# Patient Record
Sex: MALE | Race: WHITE
[De-identification: names, ages, dates, MRNs, and addresses within clinical notes are randomized per-mention and may not be internally consistent; named-entity substitution may affect disease eponyms.]

---

## 2017-03-28 ENCOUNTER — HOSPITAL ENCOUNTER (OUTPATIENT)
Dept: HOSPITAL 47 - CPPFTMAIN | Age: 57
Discharge: HOME | End: 2017-03-28
Payer: COMMERCIAL

## 2017-03-28 DIAGNOSIS — J45.909: Primary | ICD-10-CM

## 2017-03-28 DIAGNOSIS — J98.8: ICD-10-CM

## 2017-03-28 PROCEDURE — 94060 EVALUATION OF WHEEZING: CPT

## 2017-03-28 PROCEDURE — 94726 PLETHYSMOGRAPHY LUNG VOLUMES: CPT

## 2017-03-28 PROCEDURE — 94729 DIFFUSING CAPACITY: CPT

## 2023-03-21 ENCOUNTER — HOSPITAL ENCOUNTER (INPATIENT)
Dept: HOSPITAL 47 - EC | Age: 63
LOS: 3 days | Discharge: HOME | DRG: 69 | End: 2023-03-24
Attending: INTERNAL MEDICINE | Admitting: INTERNAL MEDICINE
Payer: COMMERCIAL

## 2023-03-21 DIAGNOSIS — R20.0: ICD-10-CM

## 2023-03-21 DIAGNOSIS — I34.0: ICD-10-CM

## 2023-03-21 DIAGNOSIS — Z87.19: ICD-10-CM

## 2023-03-21 DIAGNOSIS — Z79.82: ICD-10-CM

## 2023-03-21 DIAGNOSIS — G31.89: ICD-10-CM

## 2023-03-21 DIAGNOSIS — I16.0: ICD-10-CM

## 2023-03-21 DIAGNOSIS — Z79.01: ICD-10-CM

## 2023-03-21 DIAGNOSIS — E66.3: ICD-10-CM

## 2023-03-21 DIAGNOSIS — J34.2: ICD-10-CM

## 2023-03-21 DIAGNOSIS — Z79.899: ICD-10-CM

## 2023-03-21 DIAGNOSIS — R00.1: ICD-10-CM

## 2023-03-21 DIAGNOSIS — E87.1: ICD-10-CM

## 2023-03-21 DIAGNOSIS — M25.512: ICD-10-CM

## 2023-03-21 DIAGNOSIS — G45.9: Primary | ICD-10-CM

## 2023-03-21 DIAGNOSIS — J44.9: ICD-10-CM

## 2023-03-21 DIAGNOSIS — I10: ICD-10-CM

## 2023-03-21 DIAGNOSIS — R47.01: ICD-10-CM

## 2023-03-21 DIAGNOSIS — E78.5: ICD-10-CM

## 2023-03-21 DIAGNOSIS — G43.909: ICD-10-CM

## 2023-03-21 DIAGNOSIS — J32.0: ICD-10-CM

## 2023-03-21 DIAGNOSIS — I48.0: ICD-10-CM

## 2023-03-21 LAB
ALBUMIN SERPL-MCNC: 4.1 G/DL (ref 3.5–5)
ALP SERPL-CCNC: 69 U/L (ref 38–126)
ALT SERPL-CCNC: 35 U/L (ref 4–49)
ANION GAP SERPL CALC-SCNC: 6 MMOL/L
APTT BLD: 22 SEC (ref 22–30)
AST SERPL-CCNC: 26 U/L (ref 17–59)
BASOPHILS # BLD AUTO: 0.1 K/UL (ref 0–0.2)
BASOPHILS NFR BLD AUTO: 1 %
BUN SERPL-SCNC: 14 MG/DL (ref 9–20)
CALCIUM SPEC-MCNC: 8.9 MG/DL (ref 8.4–10.2)
CHLORIDE SERPL-SCNC: 107 MMOL/L (ref 98–107)
CO2 SERPL-SCNC: 25 MMOL/L (ref 22–30)
EOSINOPHIL # BLD AUTO: 0.3 K/UL (ref 0–0.7)
EOSINOPHIL NFR BLD AUTO: 4 %
ERYTHROCYTE [DISTWIDTH] IN BLOOD BY AUTOMATED COUNT: 4.99 M/UL (ref 4.3–5.9)
ERYTHROCYTE [DISTWIDTH] IN BLOOD: 13.9 % (ref 11.5–15.5)
GLUCOSE SERPL-MCNC: 116 MG/DL (ref 74–99)
HCT VFR BLD AUTO: 43.1 % (ref 39–53)
HGB BLD-MCNC: 14.8 GM/DL (ref 13–17.5)
INR PPP: 0.9 (ref ?–1.2)
LYMPHOCYTES # SPEC AUTO: 1.4 K/UL (ref 1–4.8)
LYMPHOCYTES NFR SPEC AUTO: 20 %
MCH RBC QN AUTO: 29.7 PG (ref 25–35)
MCHC RBC AUTO-ENTMCNC: 34.4 G/DL (ref 31–37)
MCV RBC AUTO: 86.3 FL (ref 80–100)
MONOCYTES # BLD AUTO: 0.5 K/UL (ref 0–1)
MONOCYTES NFR BLD AUTO: 7 %
NEUTROPHILS # BLD AUTO: 4.4 K/UL (ref 1.3–7.7)
NEUTROPHILS NFR BLD AUTO: 63 %
PLATELET # BLD AUTO: 211 K/UL (ref 150–450)
POTASSIUM SERPL-SCNC: 4.5 MMOL/L (ref 3.5–5.1)
PROT SERPL-MCNC: 6.6 G/DL (ref 6.3–8.2)
PT BLD: 9.5 SEC (ref 9–12)
SODIUM SERPL-SCNC: 138 MMOL/L (ref 137–145)
WBC # BLD AUTO: 6.9 K/UL (ref 3.8–10.6)

## 2023-03-21 PROCEDURE — 85610 PROTHROMBIN TIME: CPT

## 2023-03-21 PROCEDURE — 85730 THROMBOPLASTIN TIME PARTIAL: CPT

## 2023-03-21 PROCEDURE — 70551 MRI BRAIN STEM W/O DYE: CPT

## 2023-03-21 PROCEDURE — 93005 ELECTROCARDIOGRAM TRACING: CPT

## 2023-03-21 PROCEDURE — 93880 EXTRACRANIAL BILAT STUDY: CPT

## 2023-03-21 PROCEDURE — 96372 THER/PROPH/DIAG INJ SC/IM: CPT

## 2023-03-21 PROCEDURE — 94760 N-INVAS EAR/PLS OXIMETRY 1: CPT

## 2023-03-21 PROCEDURE — 70496 CT ANGIOGRAPHY HEAD: CPT

## 2023-03-21 PROCEDURE — 70498 CT ANGIOGRAPHY NECK: CPT

## 2023-03-21 PROCEDURE — 85025 COMPLETE CBC W/AUTO DIFF WBC: CPT

## 2023-03-21 PROCEDURE — 93306 TTE W/DOPPLER COMPLETE: CPT

## 2023-03-21 PROCEDURE — 80053 COMPREHEN METABOLIC PANEL: CPT

## 2023-03-21 PROCEDURE — 36415 COLL VENOUS BLD VENIPUNCTURE: CPT

## 2023-03-21 PROCEDURE — 93270 REMOTE 30 DAY ECG REV/REPORT: CPT

## 2023-03-21 PROCEDURE — 70450 CT HEAD/BRAIN W/O DYE: CPT

## 2023-03-21 PROCEDURE — 84484 ASSAY OF TROPONIN QUANT: CPT

## 2023-03-21 PROCEDURE — 99285 EMERGENCY DEPT VISIT HI MDM: CPT

## 2023-03-21 PROCEDURE — 96360 HYDRATION IV INFUSION INIT: CPT

## 2023-03-21 PROCEDURE — 82550 ASSAY OF CK (CPK): CPT

## 2023-03-21 PROCEDURE — 96361 HYDRATE IV INFUSION ADD-ON: CPT

## 2023-03-21 PROCEDURE — 80061 LIPID PANEL: CPT

## 2023-03-21 PROCEDURE — B246ZZ4 ULTRASONOGRAPHY OF RIGHT AND LEFT HEART, TRANSESOPHAGEAL: ICD-10-PCS

## 2023-03-21 PROCEDURE — 83036 HEMOGLOBIN GLYCOSYLATED A1C: CPT

## 2023-03-21 PROCEDURE — 71046 X-RAY EXAM CHEST 2 VIEWS: CPT

## 2023-03-21 RX ADMIN — ENOXAPARIN SODIUM SCH MG: 40 INJECTION SUBCUTANEOUS at 15:32

## 2023-03-21 RX ADMIN — CEFAZOLIN SCH: 330 INJECTION, POWDER, FOR SOLUTION INTRAMUSCULAR; INTRAVENOUS at 20:26

## 2023-03-21 RX ADMIN — AMOXICILLIN AND CLAVULANATE POTASSIUM SCH EACH: 875; 125 TABLET, FILM COATED ORAL at 20:25

## 2023-03-21 RX ADMIN — CEFAZOLIN SCH MLS/HR: 330 INJECTION, POWDER, FOR SOLUTION INTRAMUSCULAR; INTRAVENOUS at 11:32

## 2023-03-21 RX ADMIN — AMOXICILLIN AND CLAVULANATE POTASSIUM SCH EACH: 875; 125 TABLET, FILM COATED ORAL at 12:16

## 2023-03-21 RX ADMIN — ATORVASTATIN CALCIUM SCH MG: 40 TABLET, FILM COATED ORAL at 20:00

## 2023-03-21 NOTE — US
EXAMINATION TYPE: US carotid duplex BILAT

 

DATE OF EXAM: 3/21/2023

 

COMPARISON: NONE

 

CLINICAL HISTORY: Stenosis. Pt states left facial numbness 

 

TECHNIQUE: Carotid duplex ultrasound examination. Indirect Doppler criteria was utilized.

 

FINDINGS:

 

EXAM MEASUREMENTS: 

 

RIGHT:  Peak Systolic Velocity (PSV) cm/sec

----- Right CCA:  52.6  

----- Right ICA:  135.5     

----- Right ECA:  70.2   

ICA/CCA ratio:  2.6    

 

RIGHT:  End Diastole cm/sec

----- Right CCA:  9.8   

----- Right ICA:  22.5      

----- Right ECA:  7.6     

 

LEFT:  Peak Systolic Velocity (PSV) cm/sec

----- Left CCA:  71.4  

----- Left ICA:  117.7   

----- Left ECA:  76.9  

ICA/CCA ratio:  1.6  

 

LEFT:  End Diastole cm/sec

----- Left CCA:  21.9  

----- Left ICA:  43.4   

----- Left ECA:  7.6 

 

VERTEBRALS (direction of flow):

Right Vertebral: Antegrade

Left Vertebral: Antegrade

 

Rhythm:  Normal

 

SONOGRAPHER NOTES: Mildly elevated velocities within right ICA, otherwise no significant stenosis vis
ualized. Significant plaquing is not identified. Some intimal thickening is present on the left.

 

 

 

IMPRESSION:  

 

1. Moderate elevated velocity within the right internal carotid artery. This would correlate with a m
oderate stenosis based on velocities. Significant plaquing or is not identified.

 

 

Criteria for Assigning % of Stenosis / Diameter reduction

(Estimation based on the indirect measurements of the internal carotid artery velocities (ICA PSV).

1.  Normal (no stenosis)=ICA PSV < 125 cm/s: ratio < 2.0: ICA EDV<40 cm/s.

2. Less than 50% stenosis=ICA PSV < 125 cm/s: ratio < 2.0: ICA EDV<40 cm/s.

3.  50 to 69% stenosis=ICA PSV of 125 to 230 cm/s: ration 2.0 ? 4.0: ICA EDV  cm/s.

4.  Greater than 70% stenosis to near occlusion= ICA PSV > 230 cm/s: ratio > 4.0: ICA EDV > 100 cm/s.
 

5.  Near occlusion= ICA PSV velocities may be low or undetectable: variable ratio and ICA EDV.

6.  Total occlusion=unable to detect flow.

## 2023-03-21 NOTE — XR
EXAMINATION TYPE: XR chest 2V

 

DATE OF EXAM: 3/21/2023

 

COMPARISON: NONE

 

TECHNIQUE: PA and lateral views submitted.

 

HISTORY: Altered mental status

 

FINDINGS:

The lungs are clear and  there is no pneumothorax, pleural effusion, or focal pneumonia.  Heart size 
normal and no overt failure. Osseous structures demonstrate hypertrophic and degenerative changes of 
the spine. Hyperinflation suggests COPD.

 

IMPRESSION: 

1. No acute process.

## 2023-03-21 NOTE — CT
EXAMINATION TYPE: CT angio head neck

CT DLP: 929 mGycm, Automated exposure control for dose reduction was used.

 

DATE OF EXAM: 3/21/2023 5:33 PM

 

COMPARISON: CT brain 3/21/2023

 

CLINICAL INDICATION:Male, 63 years old with history of CVA, stenosis, r/o dissection, left side facia
l and arm weakness

 

TECHNIQUE: Axially acquired helical CT angiogram of the head and neck was obtained with contrast. Axi
al images are supplemented with 3D reconstructions which were post-processed at an independent workst
ation. NASCET criteria used.

Contrast used:65 mL of Isovue 370 with IV Contrast, 

Oral contrast used: None. 

 

FINDINGS:

 

CTA HEAD:

No evidence of acute intracranial hemorrhage, mass effect, or midline shift. The ventricles, sulci, a
nd cisterns are unremarkable. 

 

The visualized portions of the internal carotid arteries, middle cerebral arteries, anterior cerebral
 arteries, and posterior cerebral arteries are patent. 

Fetal origins of the posterior cerebral arteries bilaterally.

 

The basilar and vertebral arteries are patent. 

 

Paranasal sinus disease most proximal in the maxillary sinuses.

 

CTA NECK:

Right Carotid System: 

The common carotid artery and external carotid artery are patent. The carotid bifurcation demonstrate
s no evidence of hemodynamically significant stenosis. The remaining portions of the internal carotid
 artery demonstrate normal size without significant narrowing.

 

Left Carotid System: 

The common carotid artery and external carotid artery are patent. The carotid bifurcation demonstrate
s no evidence of hemodynamically significant stenosis. The remaining portions of the internal carotid
 artery demonstrate normal size without significant narrowing.

 

Vertebral arteries are patent without evidence hemodynamically significant stenosis.

 

There is a three-vessel aortic arch. The origins of the great vessels are patent. No evidence of hemo
dynamically significant stenosis.

 

Left inferior thyroid nodule measuring 1.9 x 1.5 cm.

 

IMPRESSION:

1. No evidence of dissection of the cervical internal carotid arteries or vertebral arteries or any e
vidence of significant stenosis at the carotid bifurcations. 

2. No evidence of intracranial high-grade stenosis or intracranial aneurysm.

## 2023-03-21 NOTE — P.HPIM
History of Present Illness


H&P Date: 03/21/23





History of Presenting Illness:





Patient is a 63-year-old male with a past medical history of hypertension and 

asthma.  He presented to the emergency department with a chief complaint of 

left-sided facial tingling and numbness along with tingling to left upper 

extremity.  Patient reports his day began normally and around 8:30 AM while 

undergoing a work meeting he began noticing a tingling/heaviness/numbness to the

left side of his face accompanied by left upper extremity tingling.  Patient 

reports shortly after this was accompanied by a brain fog and difficulties with 

his speech stating words would not come out and when he did did not make sense. 

Patient reports difficulties with speech only lasted moments but the tingling 

and "flat" feeling to the left side of his face remains as well as the tingling 

in his left upper extremity.  Patient states he has a very stressful job and has

been under increased stress recently and was also recently diagnosed with a 

sinus infection on 3/13/23 where he was started on Augmentin but only completed 

a 5 day course.  In addition patient was also started on amlodipine 5 mg daily 

at this time secondary to hypertension.  Patient reports he has not been under 

the care of a primary care doctor since before Covid.  Patient denies having any

headache, changes in vision or hearing, fevers or chills, dizziness/l

ightheadedness, dysphagia, sore throat, cough or congestion, chest pain or 

palpitations, shortness of breath, nausea, vomiting, or experiencing any 

numbness/swelling/focal weakness in his extremities.  Patient does report 

history of sinus surgery and uses daily nasal flushes but reports recently he is

having decreased drainage from his right nares along with increase/build up 

sinus pressure.  Patient underwent full evaluation in the emergency department. 

Upon arrival patient was found to be in hypertensive urgency with blood pressure

of 213/103 and heart rate of 66.  Patient denies taking amlodipine as previously

prescribed. EKG completed showing sinus bradycardia at 59 bpm with no noted T 

wave or ST abnormality showing no signs of acute ischemia upon personal review 

and interpretation.  CT brain completed in radiology report reviewed showing 

evidence of prior sinus surgery with mucosal thickening in right maxillary sinus

and eats moist air cells with fluid levels consistent with frontal and right 

maxillary sinusitis, CT otherwise negative for acute intracranial process.  

Chest x-ray was negative for acute cardiopulmonary process revealing mild 

hyperinflation consistent with COPD. Labs completed and reviewed.  CBC, coags, 

and CMP were unremarkable.  Troponin normal findings at less than 0.012.  

Patient along with laboratory and imaging findings were discussed in detail with

the ED physician.  Patient is being admitted to observation unit with telemetry 

under our services with consultation to neurology.





Review of systems:





Pertinent positives and negatives as discussed in HPI, a complete review of 

systems was performed and all other systems are negative.





Physical exam:





Vital signs reviewed and stable. 


General: Nontoxic, no distress and appears stated age.  


Derm: Skin warm and dry, normal coloration for ethnicity.


Head: Atraumatic, normocephalic and symmetric.  


Eyes: EOMs intact, no lid lag, and anicteric sclera


Mouth: no lip lesions, mucus membranes moist


Cardiovascular: regular rate and rhythm with normal S1S2, no murmur, positive 

posterior tibial pulses bilaterally, and cap refill < 2 seconds.  


Lungs: Respirations even, regular, and unlabored on room air. Lungs CTA bi

laterally, no rhonchi, no rales, no wheezing, and no accessory muscle usage. 


Abdominal: soft, nontender to palpation, no guarding, no appreciable 

organomegaly


Ext: ROM intact. No gross muscle atrophy, no edema, no contractures


Neuro: Speech clear, face symmetrical and CN II-XII grossly intact with no noted

focal neuro deficits


Psych: Alert and oriented to person, place, time, and situation. Appropriate and

pleasant affect. 





Assessment and Plan of Care:





Left sided facial numbness and tingling


Left upper extremity paresthesias


Brief Episode of aphasia


Rule out TIA


Hypertensive urgency


-Upon arrival patient was found to be in hypertensive urgency with blood 

pressure of 213/103 and heart rate of 66.  


-Patient denies taking amlodipine as previously prescribed. EKG completed 

showing sinus bradycardia at 59 bpm with no noted T wave or ST abnormality 

showing no signs of acute ischemia upon personal review and interpretation.  


-CT brain completed in radiology report reviewed showing evidence of prior sinus

surgery with mucosal thickening in right maxillary sinus and eats moist air 

cells with fluid levels consistent with frontal and right maxillary sinusitis, 

CT otherwise negative for acute intracranial process.  


-Chest x-ray was negative for acute cardiopulmonary process revealing mild 

hyperinflation consistent with COPD. 


-Labs completed and reviewed.  CBC, coags, and CMP were unremarkable.  Troponin 

normal findings at less than 0.012.  


-Patient along with laboratory and imaging findings were discussed in detail 

with the ED physician.  Patient is being admitted to observation unit with telem

etry under our services with consultation to neurology.


-Neuro checks every 4 hours


-Telemetry monitoring


-Order placed for lipid profile and hemoglobin A1c


-Order placed for echocardiogram and carotid Dopplers


-Order placed for daily aspirin and 25 mg daily and atorvastatin 40 mg nightly


-Order placed for vital signs every 4 hours and patient started on amlodipine 5 

mg daily.





Right frontal and maxillary sinusitis, with history of sinus surgery and 

deviated septum


-CT brain completed in radiology report reviewed showing evidence of prior sinus

surgery with mucosal thickening in right maxillary sinus and eats moist air 

cells with fluid levels consistent with frontal and right maxillary sinusitis.


-Patient recently diagnosed with sinusitis, however did not take/complete 

antibiotic course as directed.


-Augmentin 875/125 mg tablets by mouth every 12 hours 10 days.











The patient is admitted with an anticipated less than 2 midnight stay for 

evaluation of left-sided facial numbness and tingling.


CODE STATUS: Full code


DVT prophylaxis: Lovenox


Discussed with: Patient, ED physician, and RN


Anticipated discharge date: Clinical course to determine, likely 24-48 hours


Anticipated discharge place: Home


Patient was seen independently by Nurse Practitioner.


This document was prepared using Dragon dictation software.  Please allow for 

errors in transcription while rare they do occur.














Past Medical History


Past Medical History: Asthma


History of Any Multi-Drug Resistant Organisms: None Reported


Past Surgical History: Hernia Repair, Orthopedic Surgery


Past Psychological History: No Psychological Hx Reported


Smoking Status: Never smoker


Past Alcohol Use History: None Reported


Past Drug Use History: None Reported





Medications and Allergies


                                Home Medications











 Medication  Instructions  Recorded  Confirmed  Type


 


Albuterol Sulfate [Albuterol 1 - 2 puff INHALATION [id number] PRN 03/21/23 03/21/23 

History





Sulfate Hfa]    


 


Vitamin B Complex 1 cap PO DAILY 03/21/23 03/21/23 History


 


Vitamin C(Unknown Dose) 1 tab PO DAILY 03/21/23 03/21/23 History


 


Vitamin D(Unknown Dose) 1 tab PO DAILY 03/21/23 03/21/23 History


 


Vitamin K-2(Unknown Dose) 1 tab PO DAILY 03/21/23 03/21/23 History








                                    Allergies











Allergy/AdvReac Type Severity Reaction Status Date / Time


 


No Known Allergies Allergy   Verified 03/21/23 09:57














Physical Exam


Vitals: 


                                   Vital Signs











  Temp Pulse Resp BP Pulse Ox


 


 03/21/23 11:00   52 L  16  161/99  96


 


 03/21/23 10:30   60  18  171/100  96


 


 03/21/23 09:05  98 F  66  18  213/103  98








                                Intake and Output











 03/20/23 03/21/23 03/21/23





 22:59 06:59 14:59


 


Other:   


 


  Weight   108.862 kg














Results


CBC & Chem 7: 


                                 03/21/23 09:22





                                 03/21/23 09:22


Labs: 


                  Abnormal Lab Results - Last 24 Hours (Table)











  03/21/23 03/21/23 Range/Units





  09:22 09:22 


 


Glucose  116 H   (74-99)  mg/dL


 


Creatine Kinase   52 L  ()  U/L

## 2023-03-21 NOTE — ED
General Adult HPI





- General


Chief complaint: Neuro Symptoms/Deficit


Stated complaint: Slurred speech


Time Seen by Provider: 03/21/23 09:11


Source: patient, RN notes reviewed


Mode of arrival: ambulatory


Limitations: no limitations





- History of Present Illness


Initial comments: 





Patient is a pleasant 63-year-old male presenting to the emergency Department 

with several complaints.  Onset of symptoms was just after 8 AM.  Patient was 

arty awake.  Patient feels odd sensation left side of the face and arm.  No new 

weakness noted.  Patient also had some slurred speech while on a conference call

with work.  Patient still feels cloudy and that his speech is not quite normal. 

Patient states there is also time he had some mild chest discomfort described as

tightness that has resolved.  No back pain.





- Related Data


                                Home Medications











 Medication  Instructions  Recorded  Confirmed


 


Albuterol Sulfate [Albuterol 1 - 2 puff INHALATION RT-Q4H PRN 03/21/23 03/21/23





Sulfate Hfa]   


 


Vitamin B Complex 1 cap PO DAILY 03/21/23 03/21/23


 


Vitamin C(Unknown Dose) 1 tab PO DAILY 03/21/23 03/21/23


 


Vitamin D(Unknown Dose) 1 tab PO DAILY 03/21/23 03/21/23


 


Vitamin K-2(Unknown Dose) 1 tab PO DAILY 03/21/23 03/21/23











                                    Allergies











Allergy/AdvReac Type Severity Reaction Status Date / Time


 


No Known Allergies Allergy   Verified 03/21/23 09:57














Review of Systems


ROS Statement: 


Those systems with pertinent positive or pertinent negative responses have been 

documented in the HPI.





ROS Other: All systems not noted in ROS Statement are negative.


Constitutional: Denies: fever


Eyes: Denies: eye pain


ENT: Denies: ear pain


Respiratory: Denies: cough, dyspnea


Cardiovascular: Reports: as per HPI


Endocrine: Denies: fatigue


Gastrointestinal: Denies: abdominal pain


Genitourinary: Denies: urgency


Musculoskeletal: Denies: back pain


Skin: Denies: rash


Neurological: Reports: as per HPI, paresthesias.  Denies: headache, weakness





Past Medical History


Past Medical History: Asthma


History of Any Multi-Drug Resistant Organisms: None Reported


Past Surgical History: Hernia Repair, Orthopedic Surgery


Past Psychological History: No Psychological Hx Reported


Smoking Status: Never smoker


Past Alcohol Use History: None Reported


Past Drug Use History: None Reported





General Exam


Limitations: no limitations


General appearance: alert, in no apparent distress


Head exam: Present: normocephalic


Eye exam: Present: normal appearance, PERRL, EOMI.  Absent: nystagmus


ENT exam: Present: normal oropharynx


Neck exam: Present: normal inspection


Respiratory exam: Present: normal lung sounds bilaterally.  Absent: chest wall 

tenderness


Cardiovascular Exam: Present: regular rate, normal rhythm


  ** Expanded


Peripheral pulses: 2+: Radial (R), Radial (L), Posterior Tibialis (R), Posterior

Tibialis (L)


GI/Abdominal exam: Present: soft.  Absent: tenderness


Extremities exam: Present: normal inspection.  Absent: pedal edema, calf 

tenderness


Neurological exam: Present: alert, oriented X3, CN II-XII intact.  Absent: motor

sensory deficit


  ** Expanded


Neurological exam: Present: protecting the airway


Patient oriented to: Present: person, place, time


Speech: Present: fluid speech.  Absent: expressive aphasia


Cranial nerves: EOM's Intact: Normal, Facial Sensation: Normal


Sensory exam: Upper Extremity Light Touch: Normal, Lower Extremity Light Touch: 

Normal


Motor strength exam: RUE: 5, LUE: 5, RLE: 5, LLE: 5


Eye Response: (4) open spontaneously


Motor Response: (6) obeys commands


Verbal Response: (5) oriented


Psychiatric exam: Present: normal affect, normal mood


Skin exam: Present: normal color





Course


                                   Vital Signs











  03/21/23





  09:05


 


Temperature 98 F


 


Pulse Rate 66


 


Respiratory 18





Rate 


 


Blood Pressure 213/103


 


O2 Sat by Pulse 98





Oximetry 














- Reevaluation(s)


Reevaluation #1: 





03/21/23 09:21


Patient has NIH of 0 and therefore is not considered a TPA candidate.  Risks are

felt to outweigh benefits





EKG Findings





- EKG Results:


EKG: interpreted by ERMD, sinus rhythm, normal axis, normal QRS, normal ST/T


EKG shows: bradycardia





Medical Decision Making





- Medical Decision Making





Was pt. sent in by a medical professional or institution (, PA, NP, urgent 

care, hospital, or nursing home...) When possible be specific


@  -No


Did you speak to anyone other than the patient for history (EMS, parent, family,

police, friend...)? What history was obtained from this source 


@  -No


Did you review nursing and triage notes (agree or disagree)?  Why? 


@  -I reviewed and agree with nursing and triage notes


Were old charts reviewed (outside hosp., previous admission, EMS record, old 

EKG, old radiological studies, urgent care reports/EKG's, nursing home records)?

Report findings 


@  -No old charts were reviewed


Differential Diagnosis (chest pain, altered mental status, abdominal pain women,

abdominal pain men, vaginal bleeding, weakness, fever, dyspnea, syncope, 

headache, dizziness, GI bleed, back pain, seizure, CVA, palpatations, mental 

health)? 


@  -Differential Weakness:


Hypoglycemia, shock, sepsis, hyponatremia, anemia, infection, MI, ETOH, adverse 

medicine reaction, overdose, stroke, this is not meant to be an all-inclusive 

list.


EKG interpreted by me (3pts min.).


@  -As above


X-rays interpreted by me (1pt min.).


@  -Chest x-ray shows no acute process


CT interpreted by me (1pt min.).


@  -Report reviewed


U/S interpreted by me (1pt. min.).


@  -None done


What testing was considered but not performed or refused? (CT, X-rays, U/S, 

labs)? Why?


@  -None


What meds were considered but not given or refused? Why?


@  -None


Did you discuss the management of the patient with other professionals 

(professionals i.e. , PA, NP, lab, RT, psych nurse, , , 

teacher, , )? Give summary


@  -Case was discussed with Dr. Lira, who will admit for Dr. Eli


Was smoking cessation discussed for >3mins.?


@  -No


Was critical care preformed (if so, how long)?


@  -No


Were there social determinants of health that impacted care today? How? 

(Homelessness, low income, unemployed, alcoholism, drug addiction, 

transportation, low edu. Level, literacy, decrease access to med. care, group home, 

rehab)?


@  -No


Was there de-escalation of care discussed even if they declined (Discuss DNR or 

withdrawal of care, Hospice)? DNR status


@  -No


What co-morbidities impacted this encounter? (DM, HTN, Smoking, COPD, CAD, Cance

r, CVA, ARF, Chemo, Hep., AIDS, mental health diagnosis, sleep apnea, morbid 

obesity)?


@  -None


Was patient admitted / discharged? Hospital course, mention meds given and 

route, prescriptions, significant lab abnormalities, going to OR and other 

pertinent info.


@  -Patient reevaluated and states he is near symptom-free at this time.  Speech

remains normal.  Nursing staff did have some concerns with patient having mild 

speech difficulties.  Patient had earlier he did have difficulty finding words. 


Undiagnosed new problem with uncertain prognosis?


@  -No


Drug Therapy requiring intensive monitoring for toxicity (Heparin, Nitro, 

Insulin, Cardizem)?


@  -No


Were any procedures done?


@  -No


Diagnosis/symptom?


@  -TIA


Acute, or Chronic, or Acute on Chronic?


@  -Acute


Uncomplicated (without systemic symptoms) or Complicated (systemic symptoms)?


@  -default


Side effects of treatment?


@  -No


Exacerbation, Progression, or Severe Exacerbation?


@  -No


Poses a threat to life or bodily function? How? (Chest pain, USA, MI, pneumonia,

PE, COPD, DKA, ARF, appy, cholecystitis, CVA, Diverticulitis, Homicidal, 

Suicidal, threat to staff... and all critical care pts)


@  -No





- Lab Data


Result diagrams: 


                                 03/21/23 09:22





                                 03/21/23 09:22


                                   Lab Results











  03/21/23 03/21/23 03/21/23 Range/Units





  09:22 09:22 09:22 


 


WBC  6.9    (3.8-10.6)  k/uL


 


RBC  4.99    (4.30-5.90)  m/uL


 


Hgb  14.8    (13.0-17.5)  gm/dL


 


Hct  43.1    (39.0-53.0)  %


 


MCV  86.3    (80.0-100.0)  fL


 


MCH  29.7    (25.0-35.0)  pg


 


MCHC  34.4    (31.0-37.0)  g/dL


 


RDW  13.9    (11.5-15.5)  %


 


Plt Count  211    (150-450)  k/uL


 


MPV  8.4    


 


Neutrophils %  63    %


 


Lymphocytes %  20    %


 


Monocytes %  7    %


 


Eosinophils %  4    %


 


Basophils %  1    %


 


Neutrophils #  4.4    (1.3-7.7)  k/uL


 


Lymphocytes #  1.4    (1.0-4.8)  k/uL


 


Monocytes #  0.5    (0-1.0)  k/uL


 


Eosinophils #  0.3    (0-0.7)  k/uL


 


Basophils #  0.1    (0-0.2)  k/uL


 


PT   9.5   (9.0-12.0)  sec


 


INR   0.9   (<1.2)  


 


APTT   22.0   (22.0-30.0)  sec


 


Sodium    138  (137-145)  mmol/L


 


Potassium    4.5  (3.5-5.1)  mmol/L


 


Chloride    107  ()  mmol/L


 


Carbon Dioxide    25  (22-30)  mmol/L


 


Anion Gap    6  mmol/L


 


BUN    14  (9-20)  mg/dL


 


Creatinine    0.93  (0.66-1.25)  mg/dL


 


Est GFR (CKD-EPI)AfAm    >90  (>60 ml/min/1.73 sqM)  


 


Est GFR (CKD-EPI)NonAf    88  (>60 ml/min/1.73 sqM)  


 


Glucose    116 H  (74-99)  mg/dL


 


Calcium    8.9  (8.4-10.2)  mg/dL


 


Total Bilirubin    0.8  (0.2-1.3)  mg/dL


 


AST    26  (17-59)  U/L


 


ALT    35  (4-49)  U/L


 


Alkaline Phosphatase    69  ()  U/L


 


Creatine Kinase     ()  U/L


 


Troponin I     (0.000-0.034)  ng/mL


 


Total Protein    6.6  (6.3-8.2)  g/dL


 


Albumin    4.1  (3.5-5.0)  g/dL














  03/21/23 03/21/23 Range/Units





  09:22 09:22 


 


WBC    (3.8-10.6)  k/uL


 


RBC    (4.30-5.90)  m/uL


 


Hgb    (13.0-17.5)  gm/dL


 


Hct    (39.0-53.0)  %


 


MCV    (80.0-100.0)  fL


 


MCH    (25.0-35.0)  pg


 


MCHC    (31.0-37.0)  g/dL


 


RDW    (11.5-15.5)  %


 


Plt Count    (150-450)  k/uL


 


MPV    


 


Neutrophils %    %


 


Lymphocytes %    %


 


Monocytes %    %


 


Eosinophils %    %


 


Basophils %    %


 


Neutrophils #    (1.3-7.7)  k/uL


 


Lymphocytes #    (1.0-4.8)  k/uL


 


Monocytes #    (0-1.0)  k/uL


 


Eosinophils #    (0-0.7)  k/uL


 


Basophils #    (0-0.2)  k/uL


 


PT    (9.0-12.0)  sec


 


INR    (<1.2)  


 


APTT    (22.0-30.0)  sec


 


Sodium    (137-145)  mmol/L


 


Potassium    (3.5-5.1)  mmol/L


 


Chloride    ()  mmol/L


 


Carbon Dioxide    (22-30)  mmol/L


 


Anion Gap    mmol/L


 


BUN    (9-20)  mg/dL


 


Creatinine    (0.66-1.25)  mg/dL


 


Est GFR (CKD-EPI)AfAm    (>60 ml/min/1.73 sqM)  


 


Est GFR (CKD-EPI)NonAf    (>60 ml/min/1.73 sqM)  


 


Glucose    (74-99)  mg/dL


 


Calcium    (8.4-10.2)  mg/dL


 


Total Bilirubin    (0.2-1.3)  mg/dL


 


AST    (17-59)  U/L


 


ALT    (4-49)  U/L


 


Alkaline Phosphatase    ()  U/L


 


Creatine Kinase   52 L  ()  U/L


 


Troponin I  <0.012   (0.000-0.034)  ng/mL


 


Total Protein    (6.3-8.2)  g/dL


 


Albumin    (3.5-5.0)  g/dL














Disposition


Clinical Impression: 


 Transient cerebral ischemia





Disposition: ADMITTED AS IP TO THIS HOSP


Is patient prescribed a controlled substance at d/c from ED?: No


Referrals: 


Heath Michelle MD [STAFF PHYSICIAN] - 1-2 days


Time of Disposition: 10:36

## 2023-03-21 NOTE — CT
EXAMINATION TYPE: CT brain wo con for TPA

 

DATE OF EXAM: 3/21/2023

 

COMPARISON: None

 

INDICATION: Left side face and arm numbness.

 

DLP: 1157.4 mGycm, Automated exposure control for dose reduction was used.

 

CONTRAST: None

 

CT of the brain is performed utilizing 3 mm thick sections through the posterior fossa and 3 mm thick
 sections through the remaining calvarium.  Study is performed within 24 hours of arrival to the hosp
ital. 

 

No abnormal hyperdensity is present to suggest an acute intracranial hemorrhage.

No mass lesion is evident.

No acute infarcts are evident.

Ventricles and sulci are appropriate for the patient age.  

There is mucosal thickening to the right maxillary sinus. Small air-fluid level may be present. Corre
late for acute right maxillary sinusitis. Mucosal thickening is through ethmoid air cell regions. The
 ethmoidectomy and uncinectomy. Left septal deviation is present. Air-fluid level is within the front
al sinus. Mastoid air cells are clear.

 

IMPRESSIONS:

1.   No acute intracranial process. Follow-up MRI can be performed as clinically indicated.

2. Prior sinus surgery. Mucosal thickening is within right maxillary sinus and ethmoid air cells. Air
-fluid levels are within the frontal and right maxillary sinus. Correlate for sinusitis.

## 2023-03-22 LAB
CHOLEST SERPL-MCNC: 196 MG/DL (ref 0–200)
HDLC SERPL-MCNC: 44.7 MG/DL (ref 40–60)
LDLC SERPL CALC-MCNC: 131.7 MG/DL (ref 0–131)
TRIGL SERPL-MCNC: 97.8 MG/DL (ref 0–149)
VLDLC SERPL CALC-MCNC: 19.56 MG/DL (ref 5–40)

## 2023-03-22 RX ADMIN — ENOXAPARIN SODIUM SCH MG: 40 INJECTION SUBCUTANEOUS at 08:28

## 2023-03-22 RX ADMIN — CEFAZOLIN SCH: 330 INJECTION, POWDER, FOR SOLUTION INTRAMUSCULAR; INTRAVENOUS at 18:24

## 2023-03-22 RX ADMIN — AMOXICILLIN AND CLAVULANATE POTASSIUM SCH EACH: 875; 125 TABLET, FILM COATED ORAL at 08:27

## 2023-03-22 RX ADMIN — ASPIRIN 325 MG ORAL TABLET SCH MG: 325 PILL ORAL at 08:27

## 2023-03-22 RX ADMIN — CEFAZOLIN SCH: 330 INJECTION, POWDER, FOR SOLUTION INTRAMUSCULAR; INTRAVENOUS at 10:53

## 2023-03-22 RX ADMIN — AMOXICILLIN AND CLAVULANATE POTASSIUM SCH EACH: 875; 125 TABLET, FILM COATED ORAL at 20:18

## 2023-03-22 RX ADMIN — ATORVASTATIN CALCIUM SCH MG: 40 TABLET, FILM COATED ORAL at 20:17

## 2023-03-22 NOTE — P.DS
Providers


Date of admission: 


03/21/23 10:36





Expected date of discharge: 03/22/23


Attending physician: 


Bhumi Smith DO





Consults: 





                                        





03/21/23 10:36


Consult Physician Routine 


   Consulting Provider: Lucia Tavarez


   Consult Reason/Comments: tia


   Do you want consulting provider notified?: Yes











Primary care physician: 


Stated None





Hospital Course: 





Discharge Diagnosis:





TIA





Hypertensive urgency





Left sided facial numbness and tingling, secondary to TIA and uncontrolled 

hypertension





Left upper extremity paresthesias, secondary to TIA and uncontrolled 

hypertension





Brief Episode of aphasia, secondary to TIA





Hypertensive urgency, patient has history of previously diagnosed hypertension 

and has not taken them previously prescribed medications.  Patient was educated 

on importance of medication compliance and close monitoring of blood pressure as

uncontrolled hypertension can lead to serious adverse consequences on his 

health.  Patient verbalized understanding.  Patient being discharged home on 

Norvasc 10 mg daily.





Right frontal and maxillary sinusitis, with history of sinus surgery and 

deviated septum.  Patient discharged home on Augmentin 875/125 mg twice daily 

for an additional 9 days to total a treatment course with antibiotics of 10 

days.





Hospital Course: 





Patient is a 63-year-old male with a past medical history of hypertension and 

asthma.  He presented to the emergency department with a chief complaint of 

left-sided facial tingling and numbness along with tingling to left upper 

extremity.  Patient reports his day began normally and around 8:30 AM while 

undergoing a work meeting he began noticing a tingling/heaviness/numbness to the

left side of his face accompanied by left upper extremity tingling.  Patient 

reports shortly after this was accompanied by a brain fog and difficulties with 

his speech stating words would not come out and when he did did not make sense. 

Patient reports difficulties with speech only lasted moments but the tingling 

and "flat" feeling to the left side of his face remains as well as the tingling 

in his left upper extremity.  Patient states he has a very stressful job and has

been under increased stress recently and was also recently diagnosed with a 

sinus infection on 3/13/23 where he was started on Augmentin but only completed 

a 5 day course.  In addition patient was also started on amlodipine 5 mg daily 

at this time secondary to hypertension.  Patient reports he has not been under 

the care of a primary care doctor since before Covid.  Patient denies having any

headache, changes in vision or hearing, fevers or chills, 

dizziness/lightheadedness, dysphagia, sore throat, cough or congestion, chest 

pain or palpitations, shortness of breath, nausea, vomiting, or experiencing any

numbness/swelling/focal weakness in his extremities.  Patient does report 

history of sinus surgery and uses daily nasal flushes but reports recently he is

having decreased drainage from his right nares along with increase/build up 

sinus pressure.  Patient underwent full evaluation in the emergency department. 

Upon arrival patient was found to be in hypertensive urgency with blood pressure

of 213/103 and heart rate of 66.  Patient denies taking amlodipine as previously

prescribed. EKG completed showing sinus bradycardia at 59 bpm with no noted T w

ave or ST abnormality showing no signs of acute ischemia upon personal review 

and interpretation.  CT brain completed in radiology report reviewed showing 

evidence of prior sinus surgery with mucosal thickening in right maxillary sinus

and eats moist air cells with fluid levels consistent with frontal and right 

maxillary sinusitis, CT otherwise negative for acute intracranial process.  

Chest x-ray was negative for acute cardiopulmonary process revealing mild 

hyperinflation consistent with COPD. Labs completed and reviewed.  CBC, coags, 

and CMP were unremarkable.  Troponin normal findings at less than 0.012.  

Patient along with laboratory and imaging findings were discussed in detail with

the ED physician.  Patient was admitted to observation unit with telemetry under

our services with consultation to neurology.  Patient was started on aspirin and

atorvastatin.  Lipid profile completed showing elevated LDL of 131.7. Carotid 

Dopplers were completed revealing moderate elevated velocity within the right 

internal carotid artery possibly correlating with moderate stenosis based on 

velocities only as no significant plaquing was identified.  Neurologist reviewed

and placed order for CTA head and neck.  Echocardiogram done completed revealing

a mildly impaired LV with EF of 45-50%.  CTA head and neck was then completed 

and radiology report reviewed reporting negative for acute process showing no 

evidence of dissection within the cervical internal carotid arteries or 

vertebral arteries and showing no evidence of significant stenosis at the 

carotid bifurcations.  MRI showing age related atrophic and chronic small vessel

ischemic changes, negative for acute intercranial process. Neurologist 

diagnosing patient with TIA and clearing patient from neurological standpoint 

recommending patient be discharged home on daily aspirin 325 mg along with 

atorvastatin 40 mg daily.  In addition to daily aspirin and atorvastatin patient

also being discharged home on amlodipine 10 mg daily.  Patient instructed he 

will need to monitor blood pressures at home twice daily and document these 

findings and a daily log to bring with him to his next doctor's appointment.  

Patient also encouraged to establish care with not only PCP but cardiologist for

long term monitoring/management of hypertension.





Physical exam:





Vital signs reviewed and stable. 


General: Nontoxic, no distress and appears stated age.  


Derm: Skin warm and dry, normal coloration for ethnicity.


Head: Atraumatic, normocephalic and symmetric.  


Eyes: EOMs intact, no lid lag, and anicteric sclera


Mouth: no lip lesions, mucus membranes moist


Cardiovascular: regular rate and rhythm with normal S1S2, no murmur, positive 

posterior tibial pulses bilaterally, and cap refill < 2 seconds.  


Lungs: Respirations even, regular, and unlabored on room air. Lungs CTA 

bilaterally, no rhonchi, no rales, no wheezing, and no accessory muscle usage. 


Abdominal: soft, nontender to palpation, no guarding, no appreciable 

organomegaly


Ext: ROM intact. No gross muscle atrophy, no edema, no contractures


Neuro: Speech clear, face symmetrical and CN II-XII grossly intact with no noted

focal neuro deficits


Psych: Alert and oriented to person, place, time, and situation. Appropriate and

pleasant affect. 








A total of 33 minutes of time were spent preparing this complex discharge 

summary.





Pt was discharged on  3/22/23 at 4:36 PM.





Patient was seen independently by Nurse Practitioner.





This document was prepared using Dragon dictation software.  Please allow for 

errors in transcription while rare they do occur.





Ricky Ames NP rendered care for this patient independently, reviewed the 

findings and plan as documented in the note above.  I did not physically speak 

with or examine the patient on this date.


Patient Condition at Discharge: Stable





Plan - Discharge Summary


Discharge Rx Participant: Yes


New Discharge Prescriptions: 


New


   amLODIPine 10 mg PO DAILY 30 Days #30 tab


   Aspirin 325 mg PO DAILY 30 Days #30 tab


   Amoxic-Pot Clav 875-125Mg [Augmentin 875-125] 1 each PO Q12HR 9 Days #18 tab


   Atorvastatin [Lipitor] 40 mg PO HS 30 Days #30 tab





Continue


   Vitamin B Complex 1 cap PO DAILY


   Albuterol Sulfate [Albuterol Sulfate Hfa] 1 - 2 puff INHALATION RT-Q4H PRN


     PRN Reason: Shortness Of Breath


   Vitamin K-2(Unknown Dose) 1 tab PO DAILY


   Vitamin C(Unknown Dose) 1 tab PO DAILY


   Vitamin D(Unknown Dose) 1 tab PO DAILY


Discharge Medication List





Albuterol Sulfate [Albuterol Sulfate Hfa] 1 - 2 puff INHALATION RT-Q4H PRN 

03/21/23 [History]


Vitamin B Complex 1 cap PO DAILY 03/21/23 [History]


Vitamin C(Unknown Dose) 1 tab PO DAILY 03/21/23 [History]


Vitamin D(Unknown Dose) 1 tab PO DAILY 03/21/23 [History]


Vitamin K-2(Unknown Dose) 1 tab PO DAILY 03/21/23 [History]


Amoxic-Pot Clav 875-125Mg [Augmentin 875-125] 1 each PO Q12HR 9 Days #18 tab 

03/22/23 [Rx]


Aspirin 325 mg PO DAILY 30 Days #30 tab 03/22/23 [Rx]


Atorvastatin [Lipitor] 40 mg PO HS 30 Days #30 tab 03/22/23 [Rx]


amLODIPine 10 mg PO DAILY 30 Days #30 tab 03/22/23 [Rx]








Follow up Appointment(s)/Referral(s): 


Aakash Hollis MD [STAFF PHYSICIAN] - 1 Week


(It is also very important, that you follow up and establish care with 

cardiologist due to right atrial enlargement noted on echocardiogram as it is 

recommended that you have holter monitor placed to rule out underlying 

arrhythmia. 





Office will call patient with appointment )


Rk Bradshaw MD [REFERRING] - 1 Week (Primary care provider we discussed)


Patient Instructions/Handouts:  Transient Ischemic Attack (DC), Hypertensive 

Crisis (DC), Mediterranean Diet (DC), Holter Monitor (GEN)


Activity/Diet/Wound Care/Special Instructions: 





Activity:





As tolerated.  Take breaks as needed.





Diet: 





Heart healthy and carb consistent diet. Avoid salts, or foods with hidden salts 

such as canned or boxed foods and frozen dinners. Extra salt makes your heart 

work harder and traps the fluid in your body for longer.





Special Instructions:  





Take all of your medications as directed and remember to keep all of your 

doctor's appointments and follow-up as needed.  





Strongly recommended completing entire antibiotic course as prescribed for 

treatment of your acute sinusitis.





Also you are being discharged home on aspirin and Lipitor secondary to diagnosis

 of TIA. Return to ER immediately or call 911 if symptoms of numbness/tingling 

or slurred speech returns or if you develop any other symptoms such as chest 

pain or focal weakness in your extremities. 





Also recommend obtaining a blood pressure cuff and monitoring your blood 

pressure twice daily at home and documenting these findings in a daily log/baron

rnal to bring with you to her next doctor's appointment.





It is also very important, that you follow up with cardiologist, your event 

monitor report is being sent to Dr. Hollis. 





Thank you for allowing us to participate in your care, it was truly a pleasure 

having you for our patient!!! 








Discharge Disposition: HOME SELF-CARE

## 2023-03-22 NOTE — P.CNNES
History of Present Illness


Consult date: 03/21/23


Requesting physician: Chandler Francois


Reason for Consult: TIA


History of Present Illness: 





Patient is a 63-year-old right-handed male with history of hypertension, asthma,

came to the hospital for strokelike symptoms.  Patient states that last night he

went to sleep at 11 PM and was fine.  He did wake up at 3 AM and went to the 

bathroom and was feeling fine.  He woke up finally at 7:45 AM and noticed 

numbness of left side of his face and left arm was numb.  He works from home.  

When he started talking at 8:30 AM in the meeting, he noticed some slurred 

speech, but did not have problems finding words.  He felt like he was in the 

"brain fog".  He had delay in thought process.  His ears were ringing.  He 

noticed the entire left arm was tingling, but more prominent involving his left 

hand and forearm region.  He felt his left cheek was feeling flat/sleep.  He 

complained of some pain in the neck.  Also complaining of pain in the left 

shoulder, extending to the left precordial region.  He denied any dizziness, 

nausea or vomiting.  Denies any recent head or neck injury.  He denies any 

weakness in the legs.  Denies any problem with the vision.  He continues to have

numbness of the left cheek and left arm.





Vital signs on arrival blood pressure 213/103, pulse rate 66 temperature 98.0.  

Most recent blood pressure 203/101.  Blood test shows normal CBC, PT/PTT, normal

CMP.  CK is 52.  Troponin negative.





Patient has history of hypertension recently diagnosed 2 weeks ago when he went 

for a sinus congestion and was found to have elevated blood pressure.  He does 

not know about his cholesterol status, denies diabetes.  He does have asthma.  

He has not smoked since 90s and was never a heavy smoker.  Denies any alcohol.  

He does admit to having some stresses at work.  CT head revealed no acute 

intracranial process.  Prior sinus surgery.  Mucosal thickening within the right

maxillary sinus and ethmoid air cells.  Air fluid levels are within the frontal 

and right maxillary sinus.  Correlate for sinusitis.  Chest x-ray showed no 

acute process.  EKG with sinus bradycardia.





Patient states that he used to take aspirin for almost 20 years, but stopped 

taking it 5 years ago after his wife persuaded him to stop as it was not 

helpful.  Patient states that he has history of retinal migraines for last 40 

years.  Lately it has got worse, occurring about once a month.  He denies any 

nausea or vomiting with it.  He does feel tired afterwards.





Patient admits to weight gain of 15 pounds since November 2022.





Review of Systems


Constitutional: Denies chills, Denies fever


Eyes: denies blurred vision (History of retinal migraines.), denies pain


Ears: bilateral: tinnitus, deny: decreased hearing, ear discharge


Ears, nose, mouth and throat: Reports headache, Denies sore throat


Cardiovascular: Reports chest pain, Denies shortness of breath, Denies syncope


Respiratory: Denies cough, Denies excessive sputum


Gastrointestinal: Denies abdominal pain, Denies diarrhea, Denies nausea, Denies 

vomiting


Musculoskeletal: Denies myalgias, Denies neck pain


Integumentary: Denies pruritus, Denies rash


Neurological: Reports as per HPI


Psychiatric: Denies anxiety, Denies depression


Endocrine: Reports weight change, Denies fatigue


Hematologic/Lymphatic: Denies easy bruising





Past Medical History


Past Medical History: Asthma


History of Any Multi-Drug Resistant Organisms: None Reported


Past Surgical History: Hernia Repair, Orthopedic Surgery


Past Psychological History: No Psychological Hx Reported


Smoking Status: Never smoker


Past Alcohol Use History: None Reported


Past Drug Use History: None Reported





Medications and Allergies


                                Home Medications











 Medication  Instructions  Recorded  Confirmed  Type


 


Albuterol Sulfate [Albuterol 1 - 2 puff INHALATION RT-Q4H PRN 03/21/23 03/21/23 

History





Sulfate Hfa]    


 


Vitamin B Complex 1 cap PO DAILY 03/21/23 03/21/23 History


 


Vitamin C(Unknown Dose) 1 tab PO DAILY 03/21/23 03/21/23 History


 


Vitamin D(Unknown Dose) 1 tab PO DAILY 03/21/23 03/21/23 History


 


Vitamin K-2(Unknown Dose) 1 tab PO DAILY 03/21/23 03/21/23 History








                                    Allergies











Allergy/AdvReac Type Severity Reaction Status Date / Time


 


No Known Allergies Allergy   Verified 03/21/23 09:57














Physical Examination





- Vital Signs


Vital Signs: 


                                   Vital Signs











  Temp Pulse Pulse Resp BP BP Pulse Ox


 


 03/21/23 13:00   57 L   16  175/95   97


 


 03/21/23 12:10  98.9 F   65  17   203/101  98


 


 03/21/23 12:00   61   20  153/108   98


 


 03/21/23 11:00   52 L   16  161/99   96


 


 03/21/23 10:30   60   18  171/100   96


 


 03/21/23 09:05  98 F  66   18  213/103   98








                                Intake and Output











 03/21/23 03/21/23 03/21/23





 06:59 14:59 22:59


 


Other:   


 


  Weight  108.862 kg 














Patient is a late middle aged  male, in no acute distress.


Patient is alert awake oriented to time place and person.  Speech and language f

unctions are normal.  Patient can name and repeat very well.  No aphasia or 

dysarthria.  Attention, concentration and fund of knowledge is adequate. 





On cranial nerve examination, pupils are equal, round and reacting to light, 

visual fields are full on confrontation, with no neglect on double simultaneous 

stimulation.  Extraocular muscles are intact with no nystagmus.  Face is 

symmetric, tongue protrudes to the midline.  Palatal elevation and sensation 

normal, hearing and shoulder shrug normal, facial sensation normal.  





On muscle strength testing, there is left pronator drift and the strength 

is(right/left) deltoid 5/5-, biceps 5/5, triceps 5/5,  5/5-, hip flexion 

5/5-, knee extension 5/5, ankle dorsiflexion 5/5. 





Deep tendon reflexes are symmetric 1+ all over and plantars downgoing 

bilaterally.





Sensory to touch is decreased in the left arm as compared to the right arm.  

Equal in the legs.  No neglect on double simultaneous stimulation.





Cerebellar function showed no ataxia for finger-to-nose testing.  No 

dysdiadochokinesia.  No ataxia for heel-to-shin testing on either side.  Tone 

and bulk of muscles normal.





Gait deferred..





On general examination, there is no carotid bruit or murmur, S1-S2 audible.  

Chest is clear on consultation.  Abdomen is soft nontender.  No organomegaly, 

bowel sounds present.   Peripheral pulses are present.  No edema.





Results





- Laboratory Findings


CBC and BMP: 


                                 03/21/23 09:22





                                 03/21/23 09:22


Abnormal Lab Findings: 


                                  Abnormal Labs











  03/21/23 03/21/23





  09:22 09:22


 


Glucose  116 H 


 


Creatine Kinase   52 L














Assessment and Plan


Assessment: 





* Probable stroke/TIA manifesting with left facial brachial paresthesias and 

  mild left sided weakness.


* Hypertension


* Asthma


* History of retinal migraine, recently increased frequency.


Plan: 





* Patient will undergo MRI of the brain to evaluate for an acute stroke.


* Carotid Doppler revealed moderate elevated velocity within the right ICA.  

  This would correlate with a moderate stenosis based on velocities.  

  Significant plaquing is not identified.  Antegrade flow in both vertebral 

  arteries.


* We will check CTA of head and neck to rule out right ICA stenosis, rule out 

  vertebral or carotid artery dissection.


* 2-D echo rule out any embolic source.


* Hemoglobin A1c 5.7


* Fasting lipid panel


* Patient has received aspirin 325 mg in the ER.  Continue aspirin 325 mg daily.


* Permissive hypertension for next 24-48 hours.


* Telemetry monitoring


* Neuro checks every 4 hours.


* Neurology will follow.  Thank you for the consult.

## 2023-03-22 NOTE — MR
EXAMINATION TYPE: MR brain wo con

 

DATE OF EXAM: 3/22/2023 12:48 PM

 

COMPARISON: 7/5/2016

 

HISTORY: Left side facial weakness, slurred speech, increased blood pressure

 

FINDINGS:

 

The ventricles, basal cisterns and sulci overlying the cerebral convexities are mildly enlarged.  

 

There is evidence of mild periventricular white matter ischemic demyelination.  

 

Remote deep white matter insults are also noted.  

 

No acute edema is seen on diffusion weighted  imaging.   

 

There is no evidence for midline shift or mass effect.  

 

Acute intracranial hemorrhage or extra-axial collection is not evident.    

 

Mild chronic pansinusitis. Mastoid air cells are well-aerated.

 

IMPRESSION:    

 

Age-related atrophic and chronic small vessel ischemic change.  No acute intracranial process at this
 time.

## 2023-03-22 NOTE — P.PN
Subjective


Progress Note Date: 03/22/23





Patient was seen for a follow-up.  Patient states his symptoms have mostly 

resolved, but still has some minimal paresthesias left facial region.  No new 

concerns.





Objective





- Vital Signs


Vital signs: 


                                   Vital Signs











Temp  98.0 F   03/22/23 15:00


 


Pulse  61   03/22/23 15:00


 


Resp  16   03/22/23 15:00


 


BP  179/89   03/22/23 15:00


 


Pulse Ox  95   03/22/23 15:00


 


FiO2      








                                 Intake & Output











 03/21/23 03/22/23 03/22/23





 18:59 06:59 18:59


 


Intake Total   480


 


Balance   480


 


Weight 108.862 kg  


 


Intake:   


 


  Oral   480


 


Other:   


 


  Voiding Method  Toilet 


 


  # Voids 1 2 1














- Exam





Patient's mental status, speech and language functions are normal.  Cranial 

nerves are normal.  On muscle strength testing there is no pronator drift and 

the strength is normal in arms and legs.  Sensations are equal.





- Labs


CBC & Chem 7: 


                                 03/21/23 09:22





                                 03/21/23 09:22


Labs: 


                  Abnormal Lab Results - Last 24 Hours (Table)











  03/21/23 Range/Units





  09:22 


 


LDL Cholesterol, Calc  131.7 H  (0.0-131.0)  mg/dL














Assessment and Plan


Assessment: 





* Probable TIA manifesting with left facial brachial paresthesias and mild left 

  sided weakness.  Symptoms have mostly resolved.


* Hypertension


* Asthma


* History of retinal migraine, recently increased frequency.


Plan: 





* MRI of the brain revealed age-related atrophic and chronic small vessel 

  ischemic change.  No acute intracranial process.  I personally reviewed MRI, 

  and agree with the findings.  No acute ischemic stroke seen.  


* Carotid Doppler revealed moderate elevated velocity within the right ICA.  

  This would correlate with a moderate stenosis based on velocities.  

  Significant plaquing is not identified.  Antegrade flow in both vertebral 

  arteries.


* CTA of head and neck revealed no evidence of dissection of the cervical 

  internal carotid arteries or vertebral arteries or any evidence of significant

  stenosis at the carotid bifurcations.  No evidence of intracranial high-grade 

  stenosis or intracranial aneurysm.


* 2-D echo  revealed mildly impaired left ventricular function with EF between 

  45-50%.  Normal right ventricular dimension and systolic function.  Left 

  atrium is moderately increased in volume.


* Hemoglobin A1c 5.7


* Fasting lipid panel cholesterol 196, , HDL 44 and triglycerides 97.  

  Continue Lipitor 40 mg daily.  


* Patient has received aspirin 325 mg in the ER.  Continue aspirin 325 mg daily.


* Optimize control of blood pressure.  Patient has been started on amlodipine 

  today.  Blood pressure at present is 179/89.


* Patient not stable to be discharged tonight due to uncontrolled blood 

  pressure.  Recommend control of blood pressure to less than 150/100 prior to 

  discharge.  Then slowly may bring to normotensive level to target <130/80


* Also recommend 30 day event monitor prior to discharge to rule out paroxysmal 

  atrial fibrillation.


* Suggest follow-up with cardiologist for abnormal 2-D echo, may be considered 

  inpatient versus outpatient for abnormal 2-D echo.


* Discussed with primary team.

## 2023-03-22 NOTE — CA
Transthoracic Echo Report 

 Name: Gordo Hoffman 

 MRN:    [id number] 

 Age:    63     Gender:     M 

 

 :    1960 

 Exam Date:     2023 12:04 

 Exam Location: [facility] 

 Ht (in):     72     Wt (lb):     240 

 Ordering Physician:        Chandler Francois DO 

 Attending/Referring Phys: 

 Technician         Efraín Baumann RDCS 

 Procedure CPT: 

 Indications:       Thrombus 

 

 Cardiac Hx: 

 Technical Quality:      Fair 

 Contrast 1:                                Total Dose (mL): 

 Contrast 2:                                Total Dose (mL): 

 

 MEASUREMENTS  (Male / Female) Normal Values 

 2D ECHO 

 LV Diastolic Diameter PLAX        5.4 cm                4.2 - 5.9 / 3.9 - 5.3 cm 

 LV Systolic Diameter PLAX         3.7 cm                 

 IVS Diastolic Thickness           1.2 cm                0.6 - 1.0 / 0.6 - 0.9 cm 

 LVPW Diastolic Thickness          1.3 cm                0.6 - 1.0 / 0.6 - 0.9 cm 

 LV Relative Wall Thickness        0.5                    

 RV Internal Dim ED PLAX           3.7 cm                 

 LA Volume                         70.9 cm???              18 - 58 / 22 - 52 cm??? 

 

 M-MODE 

 Aortic Root Diameter MM           2.6 cm                 

 AV Cusp Separation MM             1.7 cm                 

 

 DOPPLER 

 AV Peak Velocity                  141.0 cm/s             

 AV Peak Gradient                  8.0 mmHg               

 LVOT Peak Velocity                89.7 cm/s              

 LVOT Peak Gradient                3.2 mmHg               

 MV Deceleration Nicol[place]             482.3 cm/s???            

 MV Pressure Half Time             59.3 ms                

 MV Area PHT                       3.7 cm???                

 Mitral E Point Velocity           98.5 cm/s              

 Mitral A Point Velocity           81.7 cm/s              

 Mitral E to A Ratio               1.2                    

 MV Deceleration Time              204.3 ms               

 Right Atrial Pressure             3.0 mmHg               

 PV Peak Velocity                  77.0 cm/s              

 PV Peak Gradient                  2.4 mmHg               

 

 

 FINDINGS 

 Left Ventricle 

 Mildly increased septal wall thickness. Left ventricular cavity size  

 normal.grade 2 diastolic dysfunction.  Left ventricular ejection fraction is  

 estimated at 45-50 %. 

 

 Right Ventricle 

 Normal right ventricular size. Normal right ventricular global systolic  

 function. Right ventricular systolic pressure within normal limits. 

 

 Right Atrium 

 Normal right atrial size. 

 

 Left Atrium 

 Moderately increased left atrial volume. 

 

 Mitral Valve 

 Mild mitral regurgitation. 

 

 Aortic Valve 

 Trileaflet aortic valve. No aortic regurgitation. No aortic stenosis. 

 

 Tricuspid Valve 

 Structurally normal tricuspid valve. No tricuspid regurgitation. 

 

 Pulmonic Valve 

 Structurally normal pulmonic valve. No pulmonic regurgitation. 

 

 Pericardium 

 No pericardial effusion. No pleural effusion. 

 

 Aorta 

 Normal size aortic root and proximal ascending aorta. 

 

 CONCLUSIONS 

 Mildly impaired LV function was EF between 45-50% 

 Normal RV dimension and systolic function 

 Normal pulmonary artery systolic pressure 

 Overall normal intracardiac valves 

 No evidence of pericardial effusion 

 Previewed by:  

 Dr. Aakash Hollis MD 

 (Electronically Signed) 

 Final Date:      2023 08:59

## 2023-03-23 RX ADMIN — ATORVASTATIN CALCIUM SCH MG: 40 TABLET, FILM COATED ORAL at 20:28

## 2023-03-23 RX ADMIN — ASPIRIN 325 MG ORAL TABLET SCH MG: 325 PILL ORAL at 09:43

## 2023-03-23 RX ADMIN — ENOXAPARIN SODIUM SCH MG: 40 INJECTION SUBCUTANEOUS at 09:43

## 2023-03-23 RX ADMIN — APIXABAN SCH MG: 5 TABLET, FILM COATED ORAL at 20:28

## 2023-03-23 RX ADMIN — AMOXICILLIN AND CLAVULANATE POTASSIUM SCH EACH: 875; 125 TABLET, FILM COATED ORAL at 09:43

## 2023-03-23 RX ADMIN — METOPROLOL TARTRATE SCH: 50 TABLET, FILM COATED ORAL at 20:12

## 2023-03-23 RX ADMIN — AMOXICILLIN AND CLAVULANATE POTASSIUM SCH EACH: 875; 125 TABLET, FILM COATED ORAL at 20:28

## 2023-03-23 RX ADMIN — METOPROLOL TARTRATE SCH MG: 50 TABLET, FILM COATED ORAL at 16:16

## 2023-03-23 NOTE — P.DS
Providers


Date of admission: 


03/21/23 10:36





Expected date of discharge: 03/23/23


Attending physician: 


Bhmui Smith, 





Consults: 





                                        





03/21/23 10:36


Consult Physician Routine 


   Consulting Provider: Lucia Tavarez


   Consult Reason/Comments: tia


   Do you want consulting provider notified?: Yes











Primary care physician: 


Stated None





Hospital Course: 





Discharge Diagnosis:





TIA





New-onset atrial fibrillation, patient started on Eliquis 5 mg by mouth twice a 

day.  Called and discussed with cardiologist, Dr. Garcia.  Patient to follow-up 

in office next week.





Right atrial enlargement, Pt sent home on event monitor and to follow up with 

cardiology. 





Left sided facial numbness and tingling, secondary to TIA and uncontrolled 

hypertension





Left upper extremity paresthesias, secondary to TIA and uncontrolled 

hypertension





Brief Episode of aphasia, secondary to TIA





Hypertensive urgency, patient has history of previously diagnosed hypertension 

and has not taken them previously prescribed medications.  Patient was educated 

on importance of medication compliance and close monitoring of blood pressure as

uncontrolled hypertension can lead to serious adverse consequences on his 

health.  Patient verbalized understanding.  Patient being discharged home on 

Norvasc 10 mg daily.





Right frontal and maxillary sinusitis, with history of sinus surgery and 

deviated septum.  Patient discharged home on Augmentin 875/125 mg twice daily 

for an additional 9 days to total a treatment course with antibiotics of 10 

days.





Hospital Course: 





Patient is a 63-year-old male with a past medical history of hypertension and 

asthma.  He presented to the emergency department with a chief complaint of 

left-sided facial tingling and numbness along with tingling to left upper 

extremity.  Patient reports his day began normally and around 8:30 AM while 

undergoing a work meeting he began noticing a tingling/heaviness/numbness to the

left side of his face accompanied by left upper extremity tingling.  Patient 

reports shortly after this was accompanied by a brain fog and difficulties with 

his speech stating words would not come out and when he did did not make sense. 

Patient reports difficulties with speech only lasted moments but the tingling 

and "flat" feeling to the left side of his face remains as well as the tingling 

in his left upper extremity.  Patient states he has a very stressful job and has

been under increased stress recently and was also recently diagnosed with a 

sinus infection on 3/13/23 where he was started on Augmentin but only completed 

a 5 day course.  In addition patient was also started on amlodipine 5 mg daily 

at this time secondary to hypertension.  Patient reports he has not been under 

the care of a primary care doctor since before Covid.  Patient denies having any

headache, changes in vision or hearing, fevers or chills, 

dizziness/lightheadedness, dysphagia, sore throat, cough or congestion, chest 

pain or palpitations, shortness of breath, nausea, vomiting, or experiencing any

numbness/swelling/focal weakness in his extremities.  Patient does report 

history of sinus surgery and uses daily nasal flushes but reports recently he is

having decreased drainage from his right nares along with increase/build up 

sinus pressure.  Patient underwent full evaluation in the emergency department. 

Upon arrival patient was found to be in hypertensive urgency with blood pressure

of 213/103 and heart rate of 66.  Patient denies taking amlodipine as previously

prescribed. EKG completed showing sinus bradycardia at 59 bpm with no noted T 

wave or ST abnormality showing no signs of acute ischemia upon personal review 

and interpretation.  CT brain completed in radiology report reviewed showing 

evidence of prior sinus surgery with mucosal thickening in right maxillary sinus

and eats moist air cells with fluid levels consistent with frontal and right 

maxillary sinusitis, CT otherwise negative for acute intracranial process.  

Chest x-ray was negative for acute cardiopulmonary process revealing mild 

hyperinflation consistent with COPD. Labs completed and reviewed.  CBC, coags, 

and CMP were unremarkable.  Troponin normal findings at less than 0.012.  

Patient along with laboratory and imaging findings were discussed in detail with

the ED physician.  Patient was admitted to observation unit with telemetry under

our services with consultation to neurology.  Patient was started on aspirin and

atorvastatin.  Lipid profile completed showing elevated LDL of 131.7. Carotid 

Dopplers were completed revealing moderate elevated velocity within the right 

internal carotid artery possibly correlating with moderate stenosis based on 

velocities only as no significant plaquing was identified.  Neurologist reviewed

and placed order for CTA head and neck.  Echocardiogram done completed revealing

a mildly impaired LV with EF of 45-50%.  CTA head and neck was then completed 

and radiology report reviewed reporting negative for acute process showing no 

evidence of dissection within the cervical internal carotid arteries or 

vertebral arteries and showing no evidence of significant stenosis at the 

carotid bifurcations.  MRI showing age related atrophic and chronic small vessel

ischemic changes, negative for acute intercranial process. Neurologist 

diagnosing patient with TIA and clearing patient from neurological standpoint 

recommending patient be discharged home on daily aspirin 325 mg along with 

atorvastatin 40 mg daily.  In addition to daily aspirin and atorvastatin patient

also being discharged home on amlodipine 10 mg daily.  Patient instructed he 

will need to monitor blood pressures at home twice daily and document these 

findings and a daily log to bring with him to his next doctor's appointment.  

Patient also encouraged to establish care with not only PCP but cardiologist for

long term monitoring/management of hypertension.  Telemetry monitoring revealing

patient in New-onset atrial fibrillation with a controlled ventricular rate, 

patient started on Eliquis 5 mg by mouth twice a day.  Called and discussed with

cardiologist, Dr. Garcia.  Patient to follow-up in office next week.





Physical exam:





Vital signs reviewed and stable. 


General: Nontoxic, no distress and appears stated age.  


Derm: Skin warm and dry, normal coloration for ethnicity.


Head: Atraumatic, normocephalic and symmetric.  


Eyes: EOMs intact, no lid lag, and anicteric sclera


Mouth: no lip lesions, mucus membranes moist


Cardiovascular: regular rate and rhythm with normal S1S2, no murmur, positive 

posterior tibial pulses bilaterally, and cap refill < 2 seconds.  


Lungs: Respirations even, regular, and unlabored on room air. Lungs CTA 

bilaterally, no rhonchi, no rales, no wheezing, and no accessory muscle usage. 


Abdominal: soft, nontender to palpation, no guarding, no appreciable 

organomegaly


Ext: ROM intact. No gross muscle atrophy, no edema, no contractures


Neuro: Speech clear, face symmetrical and CN II-XII grossly intact with no noted

focal neuro deficits


Psych: Alert and oriented to person, place, time, and situation. Appropriate and

pleasant affect. 








A total of 31 minutes of time were spent preparing this complex discharge 

summary.





Pt was discharged on  





Patient was seen independently by Nurse Practitioner.





This document was prepared using Dragon dictation software.  Please allow for 

errors in transcription while rare they do occur.





Patient Condition at Discharge: Stable





Plan - Discharge Summary


Discharge Rx Participant: Yes


New Discharge Prescriptions: 


New


   amLODIPine 10 mg PO DAILY 30 Days #30 tab


   Aspirin 325 mg PO DAILY 30 Days #30 tab


   Amoxic-Pot Clav 875-125Mg [Augmentin 875-125] 1 each PO Q12HR 9 Days #18 tab


   Atorvastatin [Lipitor] 40 mg PO HS 30 Days #30 tab





Continue


   Vitamin B Complex 1 cap PO DAILY


   Albuterol Sulfate [Albuterol Sulfate Hfa] 1 - 2 puff INHALATION RT-Q4H PRN


     PRN Reason: Shortness Of Breath


   Vitamin K-2(Unknown Dose) 1 tab PO DAILY


   Vitamin C(Unknown Dose) 1 tab PO DAILY


   Vitamin D(Unknown Dose) 1 tab PO DAILY


Discharge Medication List





Albuterol Sulfate [Albuterol Sulfate Hfa] 1 - 2 puff INHALATION RT-Q4H PRN 

03/21/23 [History]


Vitamin B Complex 1 cap PO DAILY 03/21/23 [History]


Vitamin C(Unknown Dose) 1 tab PO DAILY 03/21/23 [History]


Vitamin D(Unknown Dose) 1 tab PO DAILY 03/21/23 [History]


Vitamin K-2(Unknown Dose) 1 tab PO DAILY 03/21/23 [History]


Amoxic-Pot Clav 875-125Mg [Augmentin 875-125] 1 each PO Q12HR 9 Days #18 tab 

03/22/23 [Rx]


Aspirin 325 mg PO DAILY 30 Days #30 tab 03/22/23 [Rx]


Atorvastatin [Lipitor] 40 mg PO HS 30 Days #30 tab 03/22/23 [Rx]


amLODIPine 10 mg PO DAILY 30 Days #30 tab 03/22/23 [Rx]








Follow up Appointment(s)/Referral(s): 


Aakash Hollis MD [STAFF PHYSICIAN] - 1 Week


(It is also very important, that you follow up and establish care with 

cardiologist due to right atrial enlargement noted on echocardiogram as it is 

recommended that you have holter monitor placed to rule out underlying 

arrhythmia. 





Office will call patient with appointment )


Rk Bradshaw MD [REFERRING] - 1 Week (Primary care provider we discussed)


Patient Instructions/Handouts:  Transient Ischemic Attack (DC), Hypertensive 

Crisis (DC), Mediterranean Diet (DC), Holter Monitor (GEN)


Activity/Diet/Wound Care/Special Instructions: 





Activity:





As tolerated.  Take breaks as needed.





Diet: 





Heart healthy and carb consistent diet. Avoid salts, or foods with hidden salts 

such as canned or boxed foods and frozen dinners. Extra salt makes your heart 

work harder and traps the fluid in your body for longer.





Special Instructions:  





Take all of your medications as directed and remember to keep all of your 

doctor's appointments and follow-up as needed.  





Strongly recommended completing entire antibiotic course as prescribed for 

treatment of your acute sinusitis.





Also you are being discharged home on aspirin and Lipitor secondary to diagnosis

 of TIA. Return to ER immediately or call 911 if symptoms of numbness/tingling 

or slurred speech returns or if you develop any other symptoms such as chest 

pain or focal weakness in your extremities. 





Also recommend obtaining a blood pressure cuff and monitoring your blood 

pressure twice daily at home and documenting these findings in a daily 

log/journal to bring with you to her next doctor's appointment.





It is also very important, that you follow up with cardiologist, your event 

monitor report is being sent to Dr. Hollis. 





Thank you for allowing us to participate in your care, it was truly a pleasure 

having you for our patient!!! 








Discharge Disposition: HOME SELF-CARE

## 2023-03-24 VITALS
TEMPERATURE: 97.4 F | SYSTOLIC BLOOD PRESSURE: 153 MMHG | HEART RATE: 67 BPM | RESPIRATION RATE: 16 BRPM | DIASTOLIC BLOOD PRESSURE: 88 MMHG

## 2023-03-24 RX ADMIN — APIXABAN SCH MG: 5 TABLET, FILM COATED ORAL at 07:49

## 2023-03-24 RX ADMIN — AMOXICILLIN AND CLAVULANATE POTASSIUM SCH EACH: 875; 125 TABLET, FILM COATED ORAL at 07:49

## 2023-03-24 NOTE — P.CRDCN
History of Present Illness


Consult date: 03/24/23


Chief complaint: Left arm and left face numbness


History of present illness: 





The patient is a pleasant 63-year-old gentleman with a past medical history 

significant for hypertension and dyslipidemia and overweight presented to the 

hospital complaining of left face and left arm numbness.  He was in his usual 

state of health until these symptoms started.  No associated symptoms of any 

chest pain or chest discomfort and no shortness of breath and no presyncope or 

syncope.  He did have some difficulty finding the mendez as well.  The symptoms la

sted for less than 24 hours.  Subsequently the patient was admitted to the 

hospital for further evaluation.  He was diagnosed with TIA.  He was seen by the

neurology service and he underwent an MRI of the brain which came in to be 

unremarkable beside mild atrophic changes and also he underwent CTA showed no 

high-grade stenosis of the carotid.  An echo was performed and showed widely 

impaired LV function was EF around 45% with no significant valvular 

abnormalities.  Troponin was unremarkable.  Subsequently patient was found to be

in A. fib which is new diagnosis to him.  Then he was converted to normal sinus 

mechanism.  Currently he is on celina blocker with metoprolol and also he is on 

oral anticoagulation with the pressure still slightly elevated and seems to be 

consistent with stage II hypertension.  The examination is remarkable for 

regular rhythm with a clear breathing sounds bilaterally and no carotid bruit or

lower extremity edema noted





Assessment


Paroxysmal atrial fibrillation.  The patient is in sinus rhythm


Left arm numbness and left face numbness and aphasia consistent with a TIA


Hypertension


Dyslipidemia


Overweight





Plan


Continue the current medical regimen including the current dose of oral 

anticoagulation


Increase the dose of beta blocker


The echo revealed mild cardiomyopathy


Consider stress test as an outpatient


The patient can be discharged





Past Medical History


Past Medical History: Asthma


History of Any Multi-Drug Resistant Organisms: None Reported


Past Surgical History: Hernia Repair, Orthopedic Surgery


Past Psychological History: No Psychological Hx Reported


Smoking Status: Never smoker


Past Alcohol Use History: None Reported


Past Drug Use History: None Reported





Medications and Allergies


                                Home Medications











 Medication  Instructions  Recorded  Confirmed  Type


 


Albuterol Sulfate [Albuterol 1 - 2 puff INHALATION RT-Q4H PRN 03/21/23 03/21/23 

History





Sulfate Hfa]    


 


Vitamin B Complex 1 cap PO DAILY 03/21/23 03/21/23 History


 


Vitamin C(Unknown Dose) 1 tab PO DAILY 03/21/23 03/21/23 History


 


Vitamin D(Unknown Dose) 1 tab PO DAILY 03/21/23 03/21/23 History


 


Vitamin K-2(Unknown Dose) 1 tab PO DAILY 03/21/23 03/21/23 History


 


Amoxic-Pot Clav 875-125Mg 1 each PO Q12HR 9 Days #18 tab 03/22/23  Rx





[Augmentin 875-125]    


 


Aspirin 325 mg PO DAILY 30 Days #30 tab 03/22/23  Rx


 


Atorvastatin [Lipitor] 40 mg PO HS 30 Days #30 tab 03/22/23  Rx


 


amLODIPine 10 mg PO DAILY 30 Days #30 tab 03/22/23  Rx








                                    Allergies











Allergy/AdvReac Type Severity Reaction Status Date / Time


 


No Known Allergies Allergy   Verified 03/21/23 09:57














Physical Exam


Vitals: 


                                   Vital Signs











  Temp Pulse Resp BP BP Pulse Ox


 


 03/24/23 02:20  97.6 F  60  17   142/83  98


 


 03/23/23 19:00  98.1 F  83  17   140/84  97


 


 03/23/23 16:20  98 F  74  16  156/97   95


 


 03/23/23 16:16   93    164/91 


 


 03/23/23 09:50    16   








                                Intake and Output











 03/23/23 03/24/23 03/24/23





 22:59 06:59 14:59


 


Intake Total 118  


 


Balance 118  


 


Intake:   


 


  Oral 118  


 


Other:   


 


  Voiding Method Toilet  


 


  # Voids  1 














Results





                                 03/21/23 09:22





                                 03/21/23 09:22


                               Current Medications











Generic Name Dose Route Start Last Admin





  Trade Name Freq  PRN Reason Stop Dose Admin


 


Albuterol Sulfate  2.5 mg  03/21/23 11:29 





  Albuterol Nebulized 2.5 Mg/3 Ml  INHALATION  





  RT-Q4H PRN  





  Shortness Of Breath  


 


Amlodipine Besylate  5 mg  03/21/23 12:15  03/23/23 09:43





  Amlodipine 5 Mg Tab  PO   5 mg





  DAILY ENZO   Administration


 


Amoxicillin/Clavulanate Potassium  1 each  03/21/23 12:00  03/23/23 20:28





  Amoxic-Pot Clav 875-125mg 1 Each Tab  PO   1 each





  Q12HR ENZO   Administration





  Protocol  


 


Apixaban  5 mg  03/23/23 21:00  03/23/23 20:28





  Apixaban 5 Mg Tab  PO   5 mg





  BID ENZO   Administration





  Protocol  


 


Aspirin  81 mg  03/24/23 09:00 





  Aspirin 81 Mg  PO  





  DAILY ENZO  


 


Atorvastatin Calcium  40 mg  03/21/23 21:00  03/23/23 20:28





  Atorvastatin 40 Mg Tab  PO   40 mg





  HS ENZO   Administration


 


Metoprolol Tartrate  75 mg  03/24/23 09:00 





  Metoprolol Tartrate 50 Mg Tab  PO  





  BID ENZO  








                                Intake and Output











 03/23/23 03/24/23 03/24/23





 22:59 06:59 14:59


 


Intake Total 118  


 


Balance 118  


 


Intake:   


 


  Oral 118  


 


Other:   


 


  Voiding Method Toilet  


 


  # Voids  1 








                                        





                                 03/21/23 09:22 





                                 03/21/23 09:22

## 2023-03-24 NOTE — P.DS
Providers


Date of admission: 


03/21/23 10:36





Expected date of discharge: 03/24/23


Attending physician: 


Bhumi Smith, 





Consults: 





                                        





03/21/23 10:36


Consult Physician Routine 


   Consulting Provider: Lucia Tavarez


   Consult Reason/Comments: tia


   Do you want consulting provider notified?: Yes





03/23/23 15:48


Consult Physician Routine 


   Consulting Provider: Aakash Hollis


   Consult Reason/Comments: new onset atrial fib, TIA, hypertensive urgency


   Do you want consulting provider notified?: Yes, Notify in am











Primary care physician: 


Stated None





Hospital Course: 





Discharge Diagnosis:





TIA





New-onset paroxysmal atrial fibrillation, patient to continue Eliquis 5 mg by 

mouth twice a day and metoprolol 75 mg twice daily.  





Right atrial enlargement, Pt sent home on event monitor and to follow up with 

cardiology.  Patient was also diagnosed with paroxysmal atrial fibrillation 

during this stay. 





Left sided facial numbness and tingling, secondary to TIA and uncontrolled 

hypertension





Left upper extremity paresthesias, secondary to TIA and uncontrolled 

hypertension





Brief Episode of aphasia, secondary to TIA





Hypertensive urgency, patient has history of previously diagnosed hypertension 

and has not taken them previously prescribed medications.  Patient was educated 

on importance of medication compliance and close monitoring of blood pressure as

uncontrolled hypertension can lead to serious adverse consequences on his 

health.  Patient verbalized understanding.  Patient being discharged home on 

Norvasc 10 mg daily and metoprolol 75 mg twice daily.





Right frontal and maxillary sinusitis, with history of sinus surgery and 

deviated septum.  Patient discharged home on Augmentin 875/125 mg twice daily 

for an additional 9 days to total a treatment course with antibiotics of 10 

days.





Hospital Course: 





Patient is a 63-year-old male with a past medical history of hypertension and 

asthma.  He presented to the emergency department with a chief complaint of 

left-sided facial tingling and numbness along with tingling to left upper 

extremity.  Patient reports his day began normally and around 8:30 AM while 

undergoing a work meeting he began noticing a tingling/heaviness/numbness to the

left side of his face accompanied by left upper extremity tingling.  Patient 

reports shortly after this was accompanied by a brain fog and difficulties with 

his speech stating words would not come out and when he did did not make sense. 

Patient reports difficulties with speech only lasted moments but the tingling 

and "flat" feeling to the left side of his face remains as well as the tingling 

in his left upper extremity.  Patient states he has a very stressful job and has

been under increased stress recently and was also recently diagnosed with a 

sinus infection on 3/13/23 where he was started on Augmentin but only completed 

a 5 day course.  In addition patient was also started on amlodipine 5 mg daily 

at this time secondary to hypertension.  Patient reports he has not been under 

the care of a primary care doctor since before Covid.  Patient denies having any

headache, changes in vision or hearing, fevers or chills, 

dizziness/lightheadedness, dysphagia, sore throat, cough or congestion, chest 

pain or palpitations, shortness of breath, nausea, vomiting, or experiencing any

numbness/swelling/focal weakness in his extremities.  Patient does report 

history of sinus surgery and uses daily nasal flushes but reports recently he is

having decreased drainage from his right nares along with increase/build up 

sinus pressure.  Patient underwent full evaluation in the emergency department. 

Upon arrival patient was found to be in hypertensive urgency with blood pressure

of 213/103 and heart rate of 66.  Patient denies taking amlodipine as previously

prescribed. EKG completed showing sinus bradycardia at 59 bpm with no noted T 

wave or ST abnormality showing no signs of acute ischemia upon personal review 

and interpretation.  CT brain completed in radiology report reviewed showing 

evidence of prior sinus surgery with mucosal thickening in right maxillary sinus

and eats moist air cells with fluid levels consistent with frontal and right 

maxillary sinusitis, CT otherwise negative for acute intracranial process.  

Chest x-ray was negative for acute cardiopulmonary process revealing mild 

hyperinflation consistent with COPD. Labs completed and reviewed.  CBC, coags, 

and CMP were unremarkable.  Troponin normal findings at less than 0.012.  

Patient along with laboratory and imaging findings were discussed in detail with

the ED physician.  Patient was admitted to observation unit with telemetry under

our services with consultation to neurology.  Patient was started on aspirin and

atorvastatin.  Lipid profile completed showing elevated LDL of 131.7. Carotid 

Dopplers were completed revealing moderate elevated velocity within the right 

internal carotid artery possibly correlating with moderate stenosis based on 

velocities only as no significant plaquing was identified.  Neurologist reviewed

and placed order for CTA head and neck.  Echocardiogram done completed revealing

a mildly impaired LV with EF of 45-50%.  CTA head and neck was then completed 

and radiology report reviewed reporting negative for acute process showing no 

evidence of dissection within the cervical internal carotid arteries or 

vertebral arteries and showing no evidence of significant stenosis at the 

carotid bifurcations.  MRI showing age related atrophic and chronic small vessel

ischemic changes, negative for acute intercranial process. Neurologist 

diagnosing patient with TIA and clearing patient from neurological standpoint 

recommending patient be discharged home on daily aspirin 325 mg along with 

atorvastatin 40 mg daily.  In addition to daily aspirin and atorvastatin patient

also being discharged home on amlodipine 10 mg daily.  Patient instructed he 

will need to monitor blood pressures at home twice daily and document these 

findings and a daily log to bring with him to his next doctor's appointment.  

Patient also encouraged to establish care with not only PCP but cardiologist for

long term monitoring/management of hypertension.  Telemetry monitoring revealing

patient in New-onset atrial fibrillation with a controlled ventricular rate, 

patient started on Eliquis 5 mg by mouth twice a day.  He was evaluated by 

cardiology and metoprolol was increased to 75 mg twice daily.  Cardiology 

recommending outpatient follow-up in their office in one week.  Patient 

medically stable for discharge at this time.





Physical exam:





Vital signs reviewed and stable. 


General: Nontoxic, no distress and appears stated age.  


Derm: Skin warm and dry, normal coloration for ethnicity.


Head: Atraumatic, normocephalic and symmetric.  


Eyes: EOMs intact, no lid lag, and anicteric sclera


Mouth: no lip lesions, mucus membranes moist


Cardiovascular: regular rate and rhythm with normal S1S2, no murmur, positive 

posterior tibial pulses bilaterally, and cap refill < 2 seconds.  


Lungs: Respirations even, regular, and unlabored on room air. Lungs CTA 

bilaterally, no rhonchi, no rales, no wheezing, and no accessory muscle usage. 


Abdominal: soft, nontender to palpation, no guarding, no appreciable 

organomegaly


Ext: ROM intact. No gross muscle atrophy, no edema, no contractures


Neuro: Speech clear, face symmetrical and CN II-XII grossly intact with no noted

focal neuro deficits


Psych: Alert and oriented to person, place, time, and situation. Appropriate and

pleasant affect. 








A total of 33 minutes of time were spent preparing this complex discharge 

summary.





Pt was discharged on  3/24/23 at 9:30 every morning.





Patient was seen independently by Nurse Practitioner.





This document was prepared using Dragon dictation software.  Please allow for 

errors in transcription while rare they do occur.





Patient Condition at Discharge: Stable





Plan - Discharge Summary


Discharge Rx Participant: Yes


New Discharge Prescriptions: 


New


   amLODIPine 10 mg PO DAILY 30 Days #30 tab


   Amoxic-Pot Clav 875-125Mg [Augmentin 875-125] 1 each PO Q12HR 9 Days #18 tab


   Atorvastatin [Lipitor] 40 mg PO HS 30 Days #30 tab


   Apixaban [Eliquis] 5 mg PO BID 30 Days #60 tab


   Aspirin 81 mg PO DAILY 30 Days #30 tab


   Metoprolol Tartrate [Lopressor] 75 mg PO BID 30 Days #60 tablet





Continue


   Vitamin B Complex 1 cap PO DAILY


   Albuterol Sulfate [Albuterol Sulfate Hfa] 1 - 2 puff INHALATION RT-Q4H PRN


     PRN Reason: Shortness Of Breath


   Vitamin K-2(Unknown Dose) 1 tab PO DAILY


   Vitamin C(Unknown Dose) 1 tab PO DAILY


   Vitamin D(Unknown Dose) 1 tab PO DAILY


Discharge Medication List





Albuterol Sulfate [Albuterol Sulfate Hfa] 1 - 2 puff INHALATION RT-Q4H PRN 

03/21/23 [History]


Vitamin B Complex 1 cap PO DAILY 03/21/23 [History]


Vitamin C(Unknown Dose) 1 tab PO DAILY 03/21/23 [History]


Vitamin D(Unknown Dose) 1 tab PO DAILY 03/21/23 [History]


Vitamin K-2(Unknown Dose) 1 tab PO DAILY 03/21/23 [History]


Amoxic-Pot Clav 875-125Mg [Augmentin 875-125] 1 each PO Q12HR 9 Days #18 tab 

03/22/23 [Rx]


Atorvastatin [Lipitor] 40 mg PO HS 30 Days #30 tab 03/22/23 [Rx]


amLODIPine 10 mg PO DAILY 30 Days #30 tab 03/22/23 [Rx]


Apixaban [Eliquis] 5 mg PO BID 30 Days #60 tab 03/24/23 [Rx]


Aspirin 81 mg PO DAILY 30 Days #30 tab 03/24/23 [Rx]


Metoprolol Tartrate [Lopressor] 75 mg PO BID 30 Days #60 tablet 03/24/23 [Rx]








Follow up Appointment(s)/Referral(s): 


Aakash Hollis MD [STAFF PHYSICIAN] - 1 Week


(It is also very important, that you follow up and establish care with 

cardiologist due to right atrial enlargement noted on echocardiogram as it is 

recommended that you have holter monitor placed to rule out underlying 

arrhythmia. 





Office will call patient with appointment )


Rk Bradshaw MD [REFERRING] - 1 Week (Primary care provider we discussed)


Patient Instructions/Handouts:  Transient Ischemic Attack (DC), Hypertensive 

Crisis (DC), Mediterranean Diet (DC), Holter Monitor (GEN)


Activity/Diet/Wound Care/Special Instructions: 





Activity:





As tolerated.  Take breaks as needed.





Diet: 





Heart healthy and carb consistent diet. Avoid salts, or foods with hidden salts 

such as canned or boxed foods and frozen dinners. Extra salt makes your heart 

work harder and traps the fluid in your body for longer.





Special Instructions:  





Take all of your medications as directed and remember to keep all of your 

doctor's appointments and follow-up as needed.  





Strongly recommended completing entire antibiotic course as prescribed for 

treatment of your acute sinusitis.





Also you are being discharged home on aspirin and Lipitor secondary to diagnosis

of TIA. Return to ER immediately or call 911 if symptoms of numbness/tingling or

slurred speech returns or if you develop any other symptoms such as chest pain 

or focal weakness in your extremities. 





Also recommend obtaining a blood pressure cuff and monitoring your blood 

pressure twice daily at home and documenting these findings in a daily 

log/journal to bring with you to her next doctor's appointment.





It is also very important, that you follow up with cardiologist, your event 

monitor report is being sent to Dr. Hollis. 





Thank you for allowing us to participate in your care, it was truly a pleasure 

having you for our patient!!! 











****Eliquis is in [facility] pharmacy.  Patient will receive 30 days free

and a coupon for $10 monthly copay that is good for one year***








Discharge Disposition: HOME SELF-CARE